# Patient Record
Sex: FEMALE | Race: BLACK OR AFRICAN AMERICAN | URBAN - METROPOLITAN AREA
[De-identification: names, ages, dates, MRNs, and addresses within clinical notes are randomized per-mention and may not be internally consistent; named-entity substitution may affect disease eponyms.]

---

## 2023-05-25 ENCOUNTER — APPOINTMENT (RX ONLY)
Dept: URBAN - METROPOLITAN AREA CLINIC 159 | Facility: CLINIC | Age: 63
Setting detail: DERMATOLOGY
End: 2023-05-25

## 2023-05-25 DIAGNOSIS — L66.8 OTHER CICATRICIAL ALOPECIA: ICD-10-CM

## 2023-05-25 DIAGNOSIS — L66.81 CENTRAL CENTRIFUGAL CICATRICIAL ALOPECIA: ICD-10-CM

## 2023-05-25 PROCEDURE — 99204 OFFICE O/P NEW MOD 45 MIN: CPT | Mod: 25

## 2023-05-25 PROCEDURE — ? SEPARATE AND IDENTIFIABLE DOCUMENTATION

## 2023-05-25 PROCEDURE — ? COUNSELING

## 2023-05-25 PROCEDURE — ? PRESCRIPTION

## 2023-05-25 PROCEDURE — 11901 INJECT SKIN LESIONS >7: CPT

## 2023-05-25 PROCEDURE — ? INTRALESIONAL KENALOG

## 2023-05-25 PROCEDURE — ? TREATMENT REGIMEN

## 2023-05-25 RX ORDER — FLUOCINONIDE 0.5 MG/ML
SOLUTION TOPICAL TWICE A DAY
Qty: 60 | Refills: 3 | Status: ERX | COMMUNITY
Start: 2023-05-25

## 2023-05-25 RX ORDER — DOXYCYCLINE HYCLATE 20 MG/1
TABLET, FILM COATED ORAL QD
Qty: 30 | Refills: 3 | Status: ERX | COMMUNITY
Start: 2023-05-25

## 2023-05-25 RX ADMIN — DOXYCYCLINE HYCLATE 1: 20 TABLET, FILM COATED ORAL at 00:00

## 2023-05-25 RX ADMIN — FLUOCINONIDE 1: 0.5 SOLUTION TOPICAL at 00:00

## 2023-05-25 ASSESSMENT — LOCATION DETAILED DESCRIPTION DERM
LOCATION DETAILED: LEFT POSTERIOR PARIETAL SCALP
LOCATION DETAILED: RIGHT SUPERIOR PARIETAL SCALP
LOCATION DETAILED: POSTERIOR MID-PARIETAL SCALP
LOCATION DETAILED: LEFT SUPERIOR PARIETAL SCALP
LOCATION DETAILED: RIGHT POSTERIOR PARIETAL SCALP
LOCATION DETAILED: MID-OCCIPITAL SCALP
LOCATION DETAILED: LEFT SUPERIOR OCCIPITAL SCALP

## 2023-05-25 ASSESSMENT — LOCATION ZONE DERM: LOCATION ZONE: SCALP

## 2023-05-25 ASSESSMENT — LOCATION SIMPLE DESCRIPTION DERM
LOCATION SIMPLE: POSTERIOR SCALP
LOCATION SIMPLE: LEFT OCCIPITAL SCALP
LOCATION SIMPLE: SCALP

## 2023-05-25 NOTE — PROCEDURE: MIPS QUALITY
Quality 111:Pneumonia Vaccination Status For Older Adults: Patient received any pneumococcal conjugate or polysaccharide vaccine on or after their 60th birthday and before the end of the measurement period
Quality 130: Documentation Of Current Medications In The Medical Record: Current Medications Documented
Detail Level: Detailed
Quality 47: Advance Care Plan: Advance Care Planning discussed and documented; advance care plan or surrogate decision maker documented in the medical record.
Quality 110: Preventive Care And Screening: Influenza Immunization: Influenza Immunization Administered during Influenza season

## 2023-05-25 NOTE — HPI: HAIR LOSS
Previous Labs: No
How Did The Hair Loss Occur?: sudden in onset
How Severe Is Your Hair Loss?: moderate
Additional History: Patient reports she was sick with Covid in November 2022 or just the hair loss on the scalp two months ago has not had labs but is seeing her provider that manages her diabetes and they will be ordering labs tomorrow. Patient did Report sister has alopecia. Patient also reports tenderness on the scalp.

## 2023-05-25 NOTE — PROCEDURE: TREATMENT REGIMEN
Plan: States has only noticed hair loss for about 3 months and progressing quickly. Declines biopsy. Elected ILK today. Will repeat in 4-6 weeks. Photographs taken today.
Detail Level: Zone
Initiate Treatment: Start doxycycline 20mg bid with food and water \\nStart fluocinonide solution nightly

## 2023-05-25 NOTE — PROCEDURE: INTRALESIONAL KENALOG
Total Volume Injected (Ccs- Only Use Numbers And Decimals): 2
Concentration Of Solution Injected (Mg/Ml): 5.0
Administered By (Optional): Fidelina Cason MD
Kenalog Preparation: Kenalog
X Size Of Lesion In Cm (Optional): 0
Ndc# For Kenalog Only: 03 0003 0494 20
Lot # (Optional): [id number]
Medical Necessity Clause: This procedure was medically necessary because the lesions that were treated were:
Detail Level: Detailed
Validate Note Data When Using Inventory: Yes
Include Z78.9 (Other Specified Conditions Influencing Health Status) As An Associated Diagnosis?: No
Expiration Date (Optional): 08/2024
Treatment Number (Optional): 1
Consent: The risks of atrophy were reviewed with the patient.

## 2023-06-29 ENCOUNTER — APPOINTMENT (RX ONLY)
Dept: URBAN - METROPOLITAN AREA CLINIC 159 | Facility: CLINIC | Age: 63
Setting detail: DERMATOLOGY
End: 2023-06-29

## 2023-06-29 DIAGNOSIS — L66.8 OTHER CICATRICIAL ALOPECIA: ICD-10-CM | Status: IMPROVED

## 2023-06-29 DIAGNOSIS — L66.81 CENTRAL CENTRIFUGAL CICATRICIAL ALOPECIA: ICD-10-CM | Status: IMPROVED

## 2023-06-29 PROCEDURE — ? COUNSELING

## 2023-06-29 PROCEDURE — ? INTRALESIONAL KENALOG

## 2023-06-29 PROCEDURE — ? TREATMENT REGIMEN

## 2023-06-29 PROCEDURE — 11901 INJECT SKIN LESIONS >7: CPT

## 2023-06-29 ASSESSMENT — LOCATION DETAILED DESCRIPTION DERM
LOCATION DETAILED: RIGHT POSTERIOR PARIETAL SCALP
LOCATION DETAILED: MID-OCCIPITAL SCALP
LOCATION DETAILED: RIGHT SUPERIOR PARIETAL SCALP
LOCATION DETAILED: LEFT SUPERIOR OCCIPITAL SCALP
LOCATION DETAILED: LEFT POSTERIOR PARIETAL SCALP
LOCATION DETAILED: POSTERIOR MID-PARIETAL SCALP
LOCATION DETAILED: LEFT SUPERIOR PARIETAL SCALP

## 2023-06-29 ASSESSMENT — LOCATION SIMPLE DESCRIPTION DERM
LOCATION SIMPLE: POSTERIOR SCALP
LOCATION SIMPLE: SCALP
LOCATION SIMPLE: LEFT OCCIPITAL SCALP

## 2023-06-29 ASSESSMENT — LOCATION ZONE DERM: LOCATION ZONE: SCALP

## 2023-06-29 NOTE — PROCEDURE: TREATMENT REGIMEN
Plan: Todays visit: Patient is showing major improvement. Will continue with doxycycline, fluocinonide and ILK injections every 4 weeks. \\nLOV: States has only noticed hair loss for about 3 months and progressing quickly. Declines biopsy. Elected ILK today. Will repeat in 4-6 weeks. Photographs taken today.
Detail Level: Zone
Continue Regimen: doxycycline 20mg bid with food and water \\n fluocinonide solution nightly

## 2023-06-29 NOTE — PROCEDURE: INTRALESIONAL KENALOG
Total Volume Injected (Ccs- Only Use Numbers And Decimals): 2
Concentration Of Solution Injected (Mg/Ml): 5.0
Administered By (Optional): Fidelina Cason MD
Kenalog Preparation: Kenalog
X Size Of Lesion In Cm (Optional): 0
Ndc# For Kenalog Only: 0003 0494 20
Lot # (Optional): [id number]
Medical Necessity Clause: This procedure was medically necessary because the lesions that were treated were:
Detail Level: Detailed
Validate Note Data When Using Inventory: Yes
Include Z78.9 (Other Specified Conditions Influencing Health Status) As An Associated Diagnosis?: No
Expiration Date (Optional): 08/2024
Treatment Number (Optional): 1
Consent: The risks of atrophy were reviewed with the patient.

## 2023-08-22 ENCOUNTER — APPOINTMENT (RX ONLY)
Dept: URBAN - METROPOLITAN AREA CLINIC 159 | Facility: CLINIC | Age: 63
Setting detail: DERMATOLOGY
End: 2023-08-22

## 2023-08-22 DIAGNOSIS — L66.8 OTHER CICATRICIAL ALOPECIA: ICD-10-CM | Status: IMPROVED

## 2023-08-22 DIAGNOSIS — L66.81 CENTRAL CENTRIFUGAL CICATRICIAL ALOPECIA: ICD-10-CM | Status: IMPROVED

## 2023-08-22 PROCEDURE — ? INTRALESIONAL KENALOG

## 2023-08-22 PROCEDURE — ? COUNSELING

## 2023-08-22 PROCEDURE — 11901 INJECT SKIN LESIONS >7: CPT

## 2023-08-22 PROCEDURE — ? TREATMENT REGIMEN

## 2023-08-22 ASSESSMENT — LOCATION DETAILED DESCRIPTION DERM
LOCATION DETAILED: LEFT SUPERIOR PARIETAL SCALP
LOCATION DETAILED: POSTERIOR MID-PARIETAL SCALP
LOCATION DETAILED: LEFT POSTERIOR PARIETAL SCALP
LOCATION DETAILED: RIGHT POSTERIOR PARIETAL SCALP
LOCATION DETAILED: LEFT SUPERIOR OCCIPITAL SCALP
LOCATION DETAILED: MID-OCCIPITAL SCALP
LOCATION DETAILED: RIGHT SUPERIOR PARIETAL SCALP

## 2023-08-22 ASSESSMENT — LOCATION SIMPLE DESCRIPTION DERM
LOCATION SIMPLE: LEFT OCCIPITAL SCALP
LOCATION SIMPLE: POSTERIOR SCALP
LOCATION SIMPLE: SCALP

## 2023-08-22 ASSESSMENT — LOCATION ZONE DERM: LOCATION ZONE: SCALP

## 2023-08-22 NOTE — PROCEDURE: INTRALESIONAL KENALOG
Total Volume Injected (Ccs- Only Use Numbers And Decimals): 2
Concentration Of Solution Injected (Mg/Ml): 5.0
Administered By (Optional): Fidelina Cason MD
Kenalog Preparation: Kenalog
X Size Of Lesion In Cm (Optional): 0
Ndc# For Kenalog Only: 0003 0494 20
Lot # (Optional): [id number]
Medical Necessity Clause: This procedure was medically necessary because the lesions that were treated were:
Detail Level: Detailed
Validate Note Data When Using Inventory: Yes
Include Z78.9 (Other Specified Conditions Influencing Health Status) As An Associated Diagnosis?: No
Expiration Date (Optional): 08/2024
Treatment Number (Optional): 1
Consent: The risks of atrophy were reviewed with the patient.

## 2023-09-26 ENCOUNTER — APPOINTMENT (RX ONLY)
Dept: URBAN - METROPOLITAN AREA CLINIC 159 | Facility: CLINIC | Age: 63
Setting detail: DERMATOLOGY
End: 2023-09-26

## 2023-09-26 DIAGNOSIS — L66.8 OTHER CICATRICIAL ALOPECIA: ICD-10-CM

## 2023-09-26 DIAGNOSIS — L66.81 CENTRAL CENTRIFUGAL CICATRICIAL ALOPECIA: ICD-10-CM

## 2023-09-26 PROCEDURE — 11901 INJECT SKIN LESIONS >7: CPT

## 2023-09-26 PROCEDURE — ? TREATMENT REGIMEN

## 2023-09-26 PROCEDURE — ? COUNSELING

## 2023-09-26 PROCEDURE — ? INTRALESIONAL KENALOG

## 2023-09-26 ASSESSMENT — LOCATION SIMPLE DESCRIPTION DERM
LOCATION SIMPLE: LEFT OCCIPITAL SCALP
LOCATION SIMPLE: SCALP
LOCATION SIMPLE: POSTERIOR SCALP

## 2023-09-26 ASSESSMENT — LOCATION DETAILED DESCRIPTION DERM
LOCATION DETAILED: RIGHT POSTERIOR PARIETAL SCALP
LOCATION DETAILED: MID-OCCIPITAL SCALP
LOCATION DETAILED: LEFT POSTERIOR PARIETAL SCALP
LOCATION DETAILED: POSTERIOR MID-PARIETAL SCALP
LOCATION DETAILED: RIGHT SUPERIOR PARIETAL SCALP
LOCATION DETAILED: LEFT SUPERIOR OCCIPITAL SCALP
LOCATION DETAILED: LEFT SUPERIOR PARIETAL SCALP

## 2023-09-26 ASSESSMENT — LOCATION ZONE DERM: LOCATION ZONE: SCALP

## 2023-09-26 NOTE — PROCEDURE: INTRALESIONAL KENALOG
Total Volume Injected (Ccs- Only Use Numbers And Decimals): 2
Concentration Of Solution Injected (Mg/Ml): 5.0
Administered By (Optional): Fidelina Cason MD
Kenalog Preparation: Kenalog
X Size Of Lesion In Cm (Optional): 0
Ndc# For Kenalog Only: 0003 0494 20
Lot # (Optional): [id number]
Medical Necessity Clause: This procedure was medically necessary because the lesions that were treated were:
Detail Level: Detailed
Validate Note Data When Using Inventory: Yes
Include Z78.9 (Other Specified Conditions Influencing Health Status) As An Associated Diagnosis?: No
Expiration Date (Optional): 10/2025
Treatment Number (Optional): 1
Consent: The risks of atrophy were reviewed with the patient.
Kenalog Type Of Vial: Multiple Dose

## 2023-09-26 NOTE — PROCEDURE: TREATMENT REGIMEN
Plan: Todays visit:  patient improved upon exam today. Will continue ILK injections, doxycycline, and fluocinonide solution as directed. Will call when refills are needed. RTC in 2 months to recheck .\\nLOV:Patient is showing major improvement. Will continue with doxycycline, fluocinonide and ILK injections every 4 weeks. \\nLOV: States has only noticed hair loss for about 3 months and progressing quickly. Declines biopsy. Elected ILK today. Will repeat in 4-6 weeks. Photographs taken today.
Detail Level: Zone
Continue Regimen: doxycycline 20mg bid with food and water \\n fluocinonide solution nightly

## 2023-11-30 ENCOUNTER — APPOINTMENT (RX ONLY)
Dept: URBAN - METROPOLITAN AREA CLINIC 159 | Facility: CLINIC | Age: 63
Setting detail: DERMATOLOGY
End: 2023-11-30

## 2023-11-30 DIAGNOSIS — L66.81 CENTRAL CENTRIFUGAL CICATRICIAL ALOPECIA: ICD-10-CM | Status: IMPROVED

## 2023-11-30 DIAGNOSIS — L66.8 OTHER CICATRICIAL ALOPECIA: ICD-10-CM | Status: IMPROVED

## 2023-11-30 PROCEDURE — 11901 INJECT SKIN LESIONS >7: CPT

## 2023-11-30 PROCEDURE — ? TREATMENT REGIMEN

## 2023-11-30 PROCEDURE — ? PRESCRIPTION

## 2023-11-30 PROCEDURE — ? INTRALESIONAL KENALOG

## 2023-11-30 PROCEDURE — ? COUNSELING

## 2023-11-30 RX ORDER — FLUOCINONIDE 0.5 MG/ML
SOLUTION TOPICAL QHS
Qty: 60 | Refills: 5 | Status: ERX

## 2023-11-30 ASSESSMENT — LOCATION DETAILED DESCRIPTION DERM
LOCATION DETAILED: POSTERIOR MID-PARIETAL SCALP
LOCATION DETAILED: LEFT POSTERIOR PARIETAL SCALP
LOCATION DETAILED: MID-OCCIPITAL SCALP
LOCATION DETAILED: LEFT SUPERIOR PARIETAL SCALP
LOCATION DETAILED: RIGHT SUPERIOR PARIETAL SCALP
LOCATION DETAILED: LEFT SUPERIOR OCCIPITAL SCALP
LOCATION DETAILED: RIGHT POSTERIOR PARIETAL SCALP

## 2023-11-30 ASSESSMENT — LOCATION ZONE DERM: LOCATION ZONE: SCALP

## 2023-11-30 ASSESSMENT — LOCATION SIMPLE DESCRIPTION DERM
LOCATION SIMPLE: LEFT OCCIPITAL SCALP
LOCATION SIMPLE: SCALP
LOCATION SIMPLE: POSTERIOR SCALP

## 2023-11-30 NOTE — PROCEDURE: TREATMENT REGIMEN
Plan: Todays Visit: Patient shows great improvement. We will continue with ILK injections, doxycycline and fluocinonide solution. \\nLOV:  patient improved upon exam today. Will continue ILK injections, doxycycline, and fluocinonide solution as directed. Will call when refills are needed. RTC in 2 months to recheck .\\nLOV:Patient is showing major improvement. Will continue with doxycycline, fluocinonide and ILK injections every 4 weeks. \\nLOV: States has only noticed hair loss for about 3 months and progressing quickly. Declines biopsy. Elected ILK today. Will repeat in 4-6 weeks. Photographs taken today.
Detail Level: Zone
Continue Regimen: doxycycline 20mg bid with food and water \\n fluocinonide solution nightly

## 2023-11-30 NOTE — PROCEDURE: INTRALESIONAL KENALOG
Total Volume Injected (Ccs- Only Use Numbers And Decimals): 2
Concentration Of Solution Injected (Mg/Ml): 5.0
Administered By (Optional): Fidelina Cason MD
Kenalog Preparation: Kenalog
X Size Of Lesion In Cm (Optional): 0
Ndc# For Kenalog Only: 0003 0494 20
Lot # (Optional): [id number]
Medical Necessity Clause: This procedure was medically necessary because the lesions that were treated were:
Detail Level: Detailed
Validate Note Data When Using Inventory: Yes
Include Z78.9 (Other Specified Conditions Influencing Health Status) As An Associated Diagnosis?: No
Expiration Date (Optional): 11/2025
Treatment Number (Optional): 4
Consent: The risks of atrophy were reviewed with the patient.
Kenalog Type Of Vial: Multiple Dose

## 2024-02-01 ENCOUNTER — APPOINTMENT (RX ONLY)
Dept: URBAN - METROPOLITAN AREA CLINIC 159 | Facility: CLINIC | Age: 64
Setting detail: DERMATOLOGY
End: 2024-02-01

## 2024-02-01 DIAGNOSIS — L66.81 CENTRAL CENTRIFUGAL CICATRICIAL ALOPECIA: ICD-10-CM

## 2024-02-01 DIAGNOSIS — L82.1 OTHER SEBORRHEIC KERATOSIS: ICD-10-CM

## 2024-02-01 DIAGNOSIS — L66.8 OTHER CICATRICIAL ALOPECIA: ICD-10-CM

## 2024-02-01 DIAGNOSIS — L81.4 OTHER MELANIN HYPERPIGMENTATION: ICD-10-CM

## 2024-02-01 PROCEDURE — ? PRESCRIPTION

## 2024-02-01 PROCEDURE — 99213 OFFICE O/P EST LOW 20 MIN: CPT | Mod: 25

## 2024-02-01 PROCEDURE — ? COUNSELING

## 2024-02-01 PROCEDURE — 11901 INJECT SKIN LESIONS >7: CPT

## 2024-02-01 PROCEDURE — ? TREATMENT REGIMEN

## 2024-02-01 PROCEDURE — ? INTRALESIONAL KENALOG

## 2024-02-01 RX ORDER — FLUOCINONIDE 0.5 MG/ML
SOLUTION TOPICAL QHS
Qty: 60 | Refills: 5 | Status: ERX

## 2024-02-01 ASSESSMENT — LOCATION ZONE DERM
LOCATION ZONE: FACE
LOCATION ZONE: SCALP

## 2024-02-01 ASSESSMENT — LOCATION SIMPLE DESCRIPTION DERM
LOCATION SIMPLE: SCALP
LOCATION SIMPLE: RIGHT FOREHEAD
LOCATION SIMPLE: LEFT SCALP
LOCATION SIMPLE: POSTERIOR SCALP
LOCATION SIMPLE: LEFT CHEEK
LOCATION SIMPLE: SUPERIOR FOREHEAD
LOCATION SIMPLE: RIGHT CHEEK

## 2024-02-01 ASSESSMENT — LOCATION DETAILED DESCRIPTION DERM
LOCATION DETAILED: LEFT SUPERIOR LATERAL BUCCAL CHEEK
LOCATION DETAILED: POSTERIOR MID-PARIETAL SCALP
LOCATION DETAILED: LEFT INFERIOR CENTRAL MALAR CHEEK
LOCATION DETAILED: RIGHT INFERIOR CENTRAL MALAR CHEEK
LOCATION DETAILED: SUPERIOR MID FOREHEAD
LOCATION DETAILED: RIGHT SUPERIOR PARIETAL SCALP
LOCATION DETAILED: LEFT MEDIAL FRONTAL SCALP
LOCATION DETAILED: RIGHT LATERAL FOREHEAD
LOCATION DETAILED: LEFT SUPERIOR PARIETAL SCALP
LOCATION DETAILED: LEFT CENTRAL PARIETAL SCALP

## 2024-02-01 NOTE — PROCEDURE: INTRALESIONAL KENALOG
Total Volume (Ccs): 2
Concentration Of Kenalog Solution Injected (Mg/Ml): 5.0
Administered By (Optional): Fidelina Cason MD
Kenalog Preparation: Kenalog
X Size Of Lesion In Cm (Optional): 0
Ndc# For Kenalog Only: 0003 0494 20
Lot # For Kenalog (Optional): [id number]
Medical Necessity Clause: This procedure was medically necessary because the lesions that were treated were:
Detail Level: Detailed
Validate Note Data When Using Inventory: Yes
Include Z78.9 (Other Specified Conditions Influencing Health Status) As An Associated Diagnosis?: No
Expiration Date For [place]billy (Optional): 10/2025
Treatment Number (Optional): 4
Consent: The risks of atrophy were reviewed with the patient.
Kenalog Type Of Vial: Multiple Dose

## 2024-02-01 NOTE — PROCEDURE: TREATMENT REGIMEN
Plan: Today’s visit: saw improvement with status and less inflammation around the hair follicles. Will continue ILK, doxycycline and fluocinonide \\nLOV: Patient shows great improvement. We will continue with ILK injections, doxycycline and fluocinonide solution. \\nLOV:  patient improved upon exam today. Will continue ILK injections, doxycycline, and fluocinonide solution as directed. Will call when refills are needed. RTC in 2 months to recheck .\\nLOV:Patient is showing major improvement. Will continue with doxycycline, fluocinonide and ILK injections every 4 weeks. \\nLOV: States has only noticed hair loss for about 3 months and progressing quickly. Declines biopsy. Elected ILK today. Will repeat in 4-6 weeks. Photographs taken today.
Detail Level: Zone
Continue Regimen: doxycycline 20mg bid with food and water \\n fluocinonide solution nightly

## 2024-05-01 ENCOUNTER — APPOINTMENT (RX ONLY)
Dept: URBAN - METROPOLITAN AREA CLINIC 159 | Facility: CLINIC | Age: 64
Setting detail: DERMATOLOGY
End: 2024-05-01

## 2024-05-01 DIAGNOSIS — L66.81 CENTRAL CENTRIFUGAL CICATRICIAL ALOPECIA: ICD-10-CM

## 2024-05-01 DIAGNOSIS — L57.8 OTHER SKIN CHANGES DUE TO CHRONIC EXPOSURE TO NONIONIZING RADIATION: ICD-10-CM

## 2024-05-01 DIAGNOSIS — L66.8 OTHER CICATRICIAL ALOPECIA: ICD-10-CM

## 2024-05-01 PROCEDURE — ? COUNSELING

## 2024-05-01 PROCEDURE — ? INTRALESIONAL KENALOG

## 2024-05-01 PROCEDURE — 11901 INJECT SKIN LESIONS >7: CPT

## 2024-05-01 PROCEDURE — 99213 OFFICE O/P EST LOW 20 MIN: CPT | Mod: 25

## 2024-05-01 PROCEDURE — ? TREATMENT REGIMEN

## 2024-05-01 PROCEDURE — ? PRESCRIPTION

## 2024-05-01 RX ORDER — FLUOCINOLONE ACETONIDE 0.11 MG/ML
OIL TOPICAL
Qty: 118.28 | Refills: 0 | Status: ERX | COMMUNITY
Start: 2024-05-01

## 2024-05-01 RX ADMIN — FLUOCINOLONE ACETONIDE: 0.11 OIL TOPICAL at 00:00

## 2024-05-01 ASSESSMENT — LOCATION DETAILED DESCRIPTION DERM
LOCATION DETAILED: POSTERIOR MID-PARIETAL SCALP
LOCATION DETAILED: LEFT CENTRAL PARIETAL SCALP
LOCATION DETAILED: LEFT MEDIAL FRONTAL SCALP
LOCATION DETAILED: LEFT SUPERIOR PARIETAL SCALP
LOCATION DETAILED: RIGHT SUPERIOR PARIETAL SCALP

## 2024-05-01 ASSESSMENT — LOCATION ZONE DERM: LOCATION ZONE: SCALP

## 2024-05-01 ASSESSMENT — LOCATION SIMPLE DESCRIPTION DERM
LOCATION SIMPLE: LEFT SCALP
LOCATION SIMPLE: SCALP
LOCATION SIMPLE: POSTERIOR SCALP

## 2024-05-01 NOTE — PROCEDURE: INTRALESIONAL KENALOG
Total Volume (Ccs): 2
Concentration Of Kenalog Solution Injected (Mg/Ml): 5.0
Administered By (Optional): Fidelina Cason MD
Kenalog Preparation: Kenalog
X Size Of Lesion In Cm (Optional): 0
Ndc# For Kenalog Only: 0003 0494 20
Lot # For Kenalog (Optional): [id number]
Medical Necessity Clause: This procedure was medically necessary because the lesions that were treated were:
Detail Level: Detailed
Validate Note Data When Using Inventory: Yes
Include Z78.9 (Other Specified Conditions Influencing Health Status) As An Associated Diagnosis?: No
Expiration Date For [place]illy (Optional): 10/2025
Treatment Number (Optional): 5
Consent: The risks of atrophy were reviewed with the patient.
Kenalog Type Of Vial: Multiple Dose

## 2024-05-01 NOTE — PROCEDURE: TREATMENT REGIMEN
Plan: Today’s visit: pt stopped doxycycline and fluocinonide due to flakiness that began in March. She is agreeable to starting derma-smoothe to see if that helps more and as she is flaring will repeat ILK in 4 weeks rather than 3 months. \\nLOV: saw improvement with status and less inflammation around the hair follicles. Will continue ILK, doxycycline and fluocinonide \\nLOV: Patient shows great improvement. We will continue with ILK injections, doxycycline and fluocinonide solution. \\nLOV:  patient improved upon exam today. Will continue ILK injections, doxycycline, and fluocinonide solution as directed. Will call when refills are needed. RTC in 2 months to recheck .\\nLOV:Patient is showing major improvement. Will continue with doxycycline, fluocinonide and ILK injections every 4 weeks. \\nLOV: States has only noticed hair loss for about 3 months and progressing quickly. Declines biopsy. Elected ILK today. Will repeat in 4-6 weeks. Photographs taken today.
Detail Level: Zone
Discontinue Regimen: fluocinonide solution nightly (too drying for patient) \\n\\ndoxycycline 20mg bid with food and water (was not seeing improvement with this)
Initiate Treatment: Derma-Smoothe/FS Scalp Oil 0.01 %

## 2024-06-05 ENCOUNTER — APPOINTMENT (RX ONLY)
Dept: URBAN - METROPOLITAN AREA CLINIC 159 | Facility: CLINIC | Age: 64
Setting detail: DERMATOLOGY
End: 2024-06-05

## 2024-06-05 DIAGNOSIS — L66.8 OTHER CICATRICIAL ALOPECIA: ICD-10-CM

## 2024-06-05 DIAGNOSIS — L66.81 CENTRAL CENTRIFUGAL CICATRICIAL ALOPECIA: ICD-10-CM

## 2024-06-05 PROCEDURE — ? INTRALESIONAL KENALOG

## 2024-06-05 PROCEDURE — ? TREATMENT REGIMEN

## 2024-06-05 PROCEDURE — ? COUNSELING

## 2024-06-05 PROCEDURE — ? SEPARATE AND IDENTIFIABLE DOCUMENTATION

## 2024-06-05 PROCEDURE — 99214 OFFICE O/P EST MOD 30 MIN: CPT | Mod: 25

## 2024-06-05 PROCEDURE — 11901 INJECT SKIN LESIONS >7: CPT

## 2024-06-05 PROCEDURE — ? PRESCRIPTION

## 2024-06-05 RX ORDER — CLOBETASOL PROPIONATE 0.05 G/100ML
APPLY SHAMPOO TOPICAL AS DIRECTED
Qty: 118 | Refills: 3 | Status: ERX | COMMUNITY
Start: 2024-06-05

## 2024-06-05 RX ADMIN — CLOBETASOL PROPIONATE APPLY: 0.05 SHAMPOO TOPICAL at 00:00

## 2024-06-05 ASSESSMENT — LOCATION SIMPLE DESCRIPTION DERM
LOCATION SIMPLE: LEFT SCALP
LOCATION SIMPLE: POSTERIOR SCALP
LOCATION SIMPLE: SCALP
LOCATION SIMPLE: ANTERIOR SCALP

## 2024-06-05 ASSESSMENT — LOCATION DETAILED DESCRIPTION DERM
LOCATION DETAILED: RIGHT CENTRAL PARIETAL SCALP
LOCATION DETAILED: LEFT CENTRAL FRONTAL SCALP
LOCATION DETAILED: RIGHT POSTERIOR PARIETAL SCALP
LOCATION DETAILED: RIGHT SUPERIOR OCCIPITAL SCALP
LOCATION DETAILED: LEFT SUPERIOR PARIETAL SCALP
LOCATION DETAILED: RIGHT SUPERIOR PARIETAL SCALP
LOCATION DETAILED: MID-FRONTAL SCALP
LOCATION DETAILED: POSTERIOR MID-PARIETAL SCALP

## 2024-06-05 ASSESSMENT — LOCATION ZONE DERM: LOCATION ZONE: SCALP

## 2024-06-05 NOTE — PROCEDURE: TREATMENT REGIMEN
Plan: Today’s OV: patient stated she only used derma-smooth once and did not like it because it makes her hair oily and only washes her hair once weekly. Will have patient modify use of derma-smoothe to once weekly instead of once a day and add in clobetasol shampoo once a week. Fluocinonide solution was previously too drying. Will have patient follow up in August for more ILK.\\n\\nLOV: pt stopped doxycycline and fluocinonide due to flakiness that began in March. She is agreeable to starting derma-smoothe to see if that helps more and as she is flaring will repeat ILK in 4 weeks rather than 3 months. \\nLOV: saw improvement with status and less inflammation around the hair follicles. Will continue ILK, doxycycline and fluocinonide \\nLOV: Patient shows great improvement. We will continue with ILK injections, doxycycline and fluocinonide solution. \\nLOV:  patient improved upon exam today. Will continue ILK injections, doxycycline, and fluocinonide solution as directed. Will call when refills are needed. RTC in 2 months to recheck .\\nLOV:Patient is showing major improvement. Will continue with doxycycline, fluocinonide and ILK injections every 4 weeks. \\nLOV: States has only noticed hair loss for about 3 months and progressing quickly. Declines biopsy. Elected ILK today. Will repeat in 4-6 weeks. Photographs taken today.
Detail Level: Zone
Discontinue Regimen: fluocinonide solution nightly (too drying for patient) \\n\\ndoxycycline 20mg bid with food and water (was not seeing improvement with this)
Modify Regimen: Derma-Smoothe/FS Scalp Oil 0.01 %- apply once a week
Initiate Treatment: clobetasol 0.05 % shampoo\\nSig: Lather to scalp leave on 5-10 minutes then rinse clean daily until clear then as needed for flares

## 2024-06-05 NOTE — PROCEDURE: INTRALESIONAL KENALOG
How Many Mls Were Removed From The 10 Mg/Ml (5ml) Vial When Preparing The Injectable Solution?: 0
Kenalog Type Of Vial: Multiple Dose
Include Z78.9 (Other Specified Conditions Influencing Health Status) As An Associated Diagnosis?: No
Consent: The risks of atrophy were reviewed with the patient.
Detail Level: Detailed
Concentration Of Kenalog Solution Injected (Mg/Ml): 5.0
Kenalog Preparation: Kenalog
Total Volume (Ccs): 1
Validate Note Data When Using Inventory: Yes
Medical Necessity Clause: This procedure was medically necessary because the lesions that were treated were:

## 2024-09-03 ENCOUNTER — APPOINTMENT (RX ONLY)
Dept: URBAN - METROPOLITAN AREA CLINIC 159 | Facility: CLINIC | Age: 64
Setting detail: DERMATOLOGY
End: 2024-09-03

## 2024-09-03 DIAGNOSIS — L66.81 CENTRAL CENTRIFUGAL CICATRICIAL ALOPECIA: ICD-10-CM

## 2024-09-03 DIAGNOSIS — L66.8 OTHER CICATRICIAL ALOPECIA: ICD-10-CM

## 2024-09-03 PROCEDURE — ? COUNSELING

## 2024-09-03 PROCEDURE — ? INTRALESIONAL KENALOG

## 2024-09-03 PROCEDURE — 99213 OFFICE O/P EST LOW 20 MIN: CPT | Mod: 25

## 2024-09-03 PROCEDURE — 11901 INJECT SKIN LESIONS >7: CPT

## 2024-09-03 PROCEDURE — ? TREATMENT REGIMEN

## 2024-09-03 ASSESSMENT — LOCATION DETAILED DESCRIPTION DERM
LOCATION DETAILED: RIGHT CENTRAL PARIETAL SCALP
LOCATION DETAILED: RIGHT SUPERIOR PARIETAL SCALP
LOCATION DETAILED: MID-FRONTAL SCALP
LOCATION DETAILED: LEFT CENTRAL FRONTAL SCALP
LOCATION DETAILED: LEFT SUPERIOR PARIETAL SCALP
LOCATION DETAILED: POSTERIOR MID-PARIETAL SCALP
LOCATION DETAILED: RIGHT SUPERIOR OCCIPITAL SCALP

## 2024-09-03 ASSESSMENT — LOCATION ZONE DERM: LOCATION ZONE: SCALP

## 2024-09-03 ASSESSMENT — LOCATION SIMPLE DESCRIPTION DERM
LOCATION SIMPLE: ANTERIOR SCALP
LOCATION SIMPLE: SCALP
LOCATION SIMPLE: POSTERIOR SCALP
LOCATION SIMPLE: LEFT SCALP

## 2024-09-03 NOTE — PROCEDURE: TREATMENT REGIMEN
Plan: TOday 9/3/24: Patient likes clobetasol and feels her hair has not been falling out and is not itchy anymore. Discussed we might not see new hair growth but our goal is to keep inflammation down and keep hair from falling out. Discussed low inflammatory diet as well. \\n\\nLOV: patient stated she only used derma-smooth once and did not like it because it makes her hair oily and only washes her hair once weekly. Will have patient modify use of derma-smoothe to once weekly instead of once a day and add in clobetasol shampoo once a week. Fluocinonide solution was previously too drying. Will have patient follow up in August for more ILK.\\n\\nLOV: pt stopped doxycycline and fluocinonide due to flakiness that began in March. She is agreeable to starting derma-smoothe to see if that helps more and as she is flaring will repeat ILK in 4 weeks rather than 3 months. \\nLOV: saw improvement with status and less inflammation around the hair follicles. Will continue ILK, doxycycline and fluocinonide \\nLOV: Patient shows great improvement. We will continue with ILK injections, doxycycline and fluocinonide solution. \\nLOV:  patient improved upon exam today. Will continue ILK injections, doxycycline, and fluocinonide solution as directed. Will call when refills are needed. RTC in 2 months to recheck .\\nLOV:Patient is showing major improvement. Will continue with doxycycline, fluocinonide and ILK injections every 4 weeks. \\nLOV: States has only noticed hair loss for about 3 months and progressing quickly. Declines biopsy. Elected ILK today. Will repeat in 4-6 weeks. Photographs taken today.
Detail Level: Zone
Discontinue Regimen: fluocinonide solution nightly (too drying for patient) \\n\\ndoxycycline 20mg bid with food and water (was not seeing improvement with this)
Modify Regimen: Derma-Smoothe/FS Scalp Oil 0.01 %- apply once a week
Continue Regimen: clobetasol 0.05 % shampoo (Patient using once a week but feels that keeps the itching controlled)

## 2024-12-04 ENCOUNTER — APPOINTMENT (RX ONLY)
Dept: URBAN - METROPOLITAN AREA CLINIC 159 | Facility: CLINIC | Age: 64
Setting detail: DERMATOLOGY
End: 2024-12-04

## 2024-12-04 DIAGNOSIS — L73.8 OTHER SPECIFIED FOLLICULAR DISORDERS: ICD-10-CM

## 2024-12-04 DIAGNOSIS — L82.1 OTHER SEBORRHEIC KERATOSIS: ICD-10-CM

## 2024-12-04 DIAGNOSIS — L66.81 CENTRAL CENTRIFUGAL CICATRICIAL ALOPECIA: ICD-10-CM

## 2024-12-04 PROBLEM — D23.5 OTHER BENIGN NEOPLASM OF SKIN OF TRUNK: Status: ACTIVE | Noted: 2024-12-04

## 2024-12-04 PROCEDURE — ? INTRALESIONAL KENALOG

## 2024-12-04 PROCEDURE — ? COUNSELING

## 2024-12-04 PROCEDURE — ? TREATMENT REGIMEN

## 2024-12-04 PROCEDURE — 99213 OFFICE O/P EST LOW 20 MIN: CPT | Mod: 25

## 2024-12-04 PROCEDURE — 11901 INJECT SKIN LESIONS >7: CPT

## 2024-12-04 ASSESSMENT — LOCATION DETAILED DESCRIPTION DERM
LOCATION DETAILED: RIGHT SUPERIOR OCCIPITAL SCALP
LOCATION DETAILED: RIGHT SUPERIOR PARIETAL SCALP
LOCATION DETAILED: SUBXIPHOID
LOCATION DETAILED: MID-FRONTAL SCALP
LOCATION DETAILED: LEFT CENTRAL FRONTAL SCALP
LOCATION DETAILED: POSTERIOR MID-PARIETAL SCALP
LOCATION DETAILED: PERIUMBILICAL SKIN
LOCATION DETAILED: LEFT SUPERIOR LATERAL UPPER BACK
LOCATION DETAILED: RIGHT MID-UPPER BACK
LOCATION DETAILED: RIGHT MEDIAL SUPERIOR CHEST
LOCATION DETAILED: SUPERIOR THORACIC SPINE
LOCATION DETAILED: RIGHT CENTRAL PARIETAL SCALP
LOCATION DETAILED: LEFT SUPERIOR PARIETAL SCALP
LOCATION DETAILED: LEFT SUPERIOR MEDIAL MIDBACK

## 2024-12-04 ASSESSMENT — LOCATION SIMPLE DESCRIPTION DERM
LOCATION SIMPLE: POSTERIOR SCALP
LOCATION SIMPLE: UPPER BACK
LOCATION SIMPLE: CHEST
LOCATION SIMPLE: LEFT SCALP
LOCATION SIMPLE: ANTERIOR SCALP
LOCATION SIMPLE: ABDOMEN
LOCATION SIMPLE: LEFT UPPER BACK
LOCATION SIMPLE: LEFT LOWER BACK
LOCATION SIMPLE: RIGHT UPPER BACK
LOCATION SIMPLE: SCALP

## 2024-12-04 ASSESSMENT — LOCATION ZONE DERM
LOCATION ZONE: TRUNK
LOCATION ZONE: SCALP

## 2024-12-04 NOTE — PROCEDURE: TREATMENT REGIMEN
Plan: Today 12/04/2024: Patient likes clobetasol and feels her hair has not been falling out and is not itchy anymore. Discussed we might not see new hair growth but our goal is to keep inflammation down and keep hair from falling out. Discussed low inflammatory diet as well.\\n\\n\\nTOday 9/3/24: Patient likes clobetasol and feels her hair has not been falling out and is not itchy anymore. Discussed we might not see new hair growth but our goal is to keep inflammation down and keep hair from falling out. Discussed low inflammatory diet as well. \\n\\nLOV: patient stated she only used derma-smooth once and did not like it because it makes her hair oily and only washes her hair once weekly. Will have patient modify use of derma-smoothe to once weekly instead of once a day and add in clobetasol shampoo once a week. Fluocinonide solution was previously too drying. Will have patient follow up in August for more ILK.\\n\\nLOV: pt stopped doxycycline and fluocinonide due to flakiness that began in March. She is agreeable to starting derma-smoothe to see if that helps more and as she is flaring will repeat ILK in 4 weeks rather than 3 months. \\nLOV: saw improvement with status and less inflammation around the hair follicles. Will continue ILK, doxycycline and fluocinonide \\nLOV: Patient shows great improvement. We will continue with ILK injections, doxycycline and fluocinonide solution. \\nLOV:  patient improved upon exam today. Will continue ILK injections, doxycycline, and fluocinonide solution as directed. Will call when refills are needed. RTC in 2 months to recheck .\\nLOV:Patient is showing major improvement. Will continue with doxycycline, fluocinonide and ILK injections every 4 weeks. \\nLOV: States has only noticed hair loss for about 3 months and progressing quickly. Declines biopsy. Elected ILK today. Will repeat in 4-6 weeks. Photographs taken today.
Detail Level: Zone
Discontinue Regimen: fluocinonide solution nightly (too drying for patient) \\n\\ndoxycycline 20mg bid with food and water (was not seeing improvement with this)
Modify Regimen: Derma-Smoothe/FS Scalp Oil 0.01 %- apply once a week
Continue Regimen: clobetasol 0.05 % shampoo (Patient using once a week but feels that keeps the itching controlled)

## 2024-12-04 NOTE — PROCEDURE: MIPS QUALITY
Quality 226: Preventive Care And Screening: Tobacco Use: Screening And Cessation Intervention: Tobacco Screening not Performed
Detail Level: Detailed
Quality 47: Advance Care Plan: Advance Care Planning discussed and documented; advance care plan or surrogate decision maker documented in the medical record.
Quality 130: Documentation Of Current Medications In The Medical Record: Current Medications Documented

## 2025-03-11 ENCOUNTER — APPOINTMENT (OUTPATIENT)
Dept: URBAN - METROPOLITAN AREA CLINIC 159 | Facility: CLINIC | Age: 65
Setting detail: DERMATOLOGY
End: 2025-03-11

## 2025-03-11 DIAGNOSIS — L66.81 CENTRAL CENTRIFUGAL CICATRICIAL ALOPECIA: ICD-10-CM

## 2025-03-11 DIAGNOSIS — L30.0 NUMMULAR DERMATITIS: ICD-10-CM

## 2025-03-11 PROCEDURE — 11901 INJECT SKIN LESIONS >7: CPT

## 2025-03-11 PROCEDURE — ? TREATMENT REGIMEN

## 2025-03-11 PROCEDURE — 99214 OFFICE O/P EST MOD 30 MIN: CPT | Mod: 25

## 2025-03-11 PROCEDURE — ? COUNSELING

## 2025-03-11 PROCEDURE — ? PRESCRIPTION

## 2025-03-11 PROCEDURE — ? INTRALESIONAL KENALOG

## 2025-03-11 RX ORDER — TRIAMCINOLONE ACETONIDE 1 MG/G
CREAM TOPICAL BID
Qty: 30 | Refills: 3 | Status: ERX | COMMUNITY
Start: 2025-03-11

## 2025-03-11 RX ADMIN — TRIAMCINOLONE ACETONIDE: 1 CREAM TOPICAL at 00:00

## 2025-03-11 ASSESSMENT — LOCATION DETAILED DESCRIPTION DERM
LOCATION DETAILED: LEFT CENTRAL FRONTAL SCALP
LOCATION DETAILED: RIGHT CENTRAL PARIETAL SCALP
LOCATION DETAILED: RIGHT SUPERIOR PARIETAL SCALP
LOCATION DETAILED: LEFT SUPERIOR PARIETAL SCALP
LOCATION DETAILED: POSTERIOR MID-PARIETAL SCALP
LOCATION DETAILED: MID-FRONTAL SCALP
LOCATION DETAILED: RIGHT SUPERIOR OCCIPITAL SCALP

## 2025-03-11 ASSESSMENT — LOCATION ZONE DERM: LOCATION ZONE: SCALP

## 2025-03-11 ASSESSMENT — LOCATION SIMPLE DESCRIPTION DERM
LOCATION SIMPLE: SCALP
LOCATION SIMPLE: ANTERIOR SCALP
LOCATION SIMPLE: LEFT SCALP
LOCATION SIMPLE: POSTERIOR SCALP

## 2025-03-11 NOTE — PROCEDURE: TREATMENT REGIMEN
Plan: Today 3/25: Patient having a minor flare but has stopped the topicals. She will restart clobetasol shampoo once or twice a week (does not want oil) \\n\\n\\n12/04/2024: Patient likes clobetasol and feels her hair has not been falling out and is not itchy anymore. Discussed we might not see new hair growth but our goal is to keep inflammation down and keep hair from falling out. Discussed low inflammatory diet as well.\\n\\n\\nTOday 9/3/24: Patient likes clobetasol and feels her hair has not been falling out and is not itchy anymore. Discussed we might not see new hair growth but our goal is to keep inflammation down and keep hair from falling out. Discussed low inflammatory diet as well. \\n\\nLOV: patient stated she only used derma-smooth once and did not like it because it makes her hair oily and only washes her hair once weekly. Will have patient modify use of derma-smoothe to once weekly instead of once a day and add in clobetasol shampoo once a week. Fluocinonide solution was previously too drying. Will have patient follow up in August for more ILK.\\n\\nLOV: pt stopped doxycycline and fluocinonide due to flakiness that began in March. She is agreeable to starting derma-smoothe to see if that helps more and as she is flaring will repeat ILK in 4 weeks rather than 3 months. \\nLOV: saw improvement with status and less inflammation around the hair follicles. Will continue ILK, doxycycline and fluocinonide \\nLOV: Patient shows great improvement. We will continue with ILK injections, doxycycline and fluocinonide solution. \\nLOV:  patient improved upon exam today. Will continue ILK injections, doxycycline, and fluocinonide solution as directed. Will call when refills are needed. RTC in 2 months to recheck .\\nLOV:Patient is showing major improvement. Will continue with doxycycline, fluocinonide and ILK injections every 4 weeks. \\nLOV: States has only noticed hair loss for about 3 months and progressing quickly. Declines biopsy. Elected ILK today. Will repeat in 4-6 weeks. Photographs taken today.
Detail Level: Zone
Discontinue Regimen: fluocinonide solution nightly (too drying for patient) \\n\\ndoxycycline 20mg bid with food and water (was not seeing improvement with this)\\n\\nDerma-Smoothe/FS Scalp Oil 0.01 %- apply once a week
Continue Regimen: clobetasol 0.05 % shampoo (Patient using once a week but feels that keeps the itching controlled)

## 2025-06-24 ENCOUNTER — APPOINTMENT (OUTPATIENT)
Dept: URBAN - METROPOLITAN AREA CLINIC 159 | Facility: CLINIC | Age: 65
Setting detail: DERMATOLOGY
End: 2025-06-24

## 2025-06-24 DIAGNOSIS — L66.81 CENTRAL CENTRIFUGAL CICATRICIAL ALOPECIA: ICD-10-CM | Status: WELL CONTROLLED

## 2025-06-24 PROCEDURE — ? TREATMENT REGIMEN

## 2025-06-24 PROCEDURE — ? INTRALESIONAL KENALOG

## 2025-06-24 PROCEDURE — ? COUNSELING

## 2025-06-24 ASSESSMENT — LOCATION DETAILED DESCRIPTION DERM
LOCATION DETAILED: POSTERIOR MID-PARIETAL SCALP
LOCATION DETAILED: LEFT CENTRAL FRONTAL SCALP
LOCATION DETAILED: RIGHT SUPERIOR OCCIPITAL SCALP
LOCATION DETAILED: RIGHT CENTRAL PARIETAL SCALP
LOCATION DETAILED: LEFT SUPERIOR PARIETAL SCALP
LOCATION DETAILED: MID-FRONTAL SCALP
LOCATION DETAILED: RIGHT SUPERIOR PARIETAL SCALP

## 2025-06-24 ASSESSMENT — LOCATION ZONE DERM: LOCATION ZONE: SCALP

## 2025-06-24 NOTE — PROCEDURE: TREATMENT REGIMEN
Plan: Today 6/25: will continue with injections well controlled. \\n\\n\\nToday 3/25: Patient having a minor flare but has stopped the topicals. She will restart clobetasol shampoo once or twice a week (does not want oil) \\n\\n\\n12/04/2024: Patient likes clobetasol and feels her hair has not been falling out and is not itchy anymore. Discussed we might not see new hair growth but our goal is to keep inflammation down and keep hair from falling out. Discussed low inflammatory diet as well.\\n\\n\\nTOday 9/3/24: Patient likes clobetasol and feels her hair has not been falling out and is not itchy anymore. Discussed we might not see new hair growth but our goal is to keep inflammation down and keep hair from falling out. Discussed low inflammatory diet as well. \\n\\nLOV: patient stated she only used derma-smooth once and did not like it because it makes her hair oily and only washes her hair once weekly. Will have patient modify use of derma-smoothe to once weekly instead of once a day and add in clobetasol shampoo once a week. Fluocinonide solution was previously too drying. Will have patient follow up in August for more ILK.\\n\\nLOV: pt stopped doxycycline and fluocinonide due to flakiness that began in March. She is agreeable to starting derma-smoothe to see if that helps more and as she is flaring will repeat ILK in 4 weeks rather than 3 months. \\nLOV: saw improvement with status and less inflammation around the hair follicles. Will continue ILK, doxycycline and fluocinonide \\nLOV: Patient shows great improvement. We will continue with ILK injections, doxycycline and fluocinonide solution. \\nLOV:  patient improved upon exam today. Will continue ILK injections, doxycycline, and fluocinonide solution as directed. Will call when refills are needed. RTC in 2 months to recheck .\\nLOV:Patient is showing major improvement. Will continue with doxycycline, fluocinonide and ILK injections every 4 weeks. \\nLOV: States has only noticed hair loss for about 3 months and progressing quickly. Declines biopsy. Elected ILK today. Will repeat in 4-6 weeks. Photographs taken today.
Detail Level: Zone
Discontinue Regimen: fluocinonide solution nightly (too drying for patient) \\n\\ndoxycycline 20mg bid with food and water (was not seeing improvement with this)\\n\\nDerma-Smoothe/FS Scalp Oil 0.01 %- apply once a week
Continue Regimen: clobetasol 0.05 % shampoo (Patient using once a week but feels that keeps the itching controlled)